# Patient Record
(demographics unavailable — no encounter records)

---

## 2019-04-30 NOTE — ED PEDIATRIC ILLNESS
HPI-Pediatric Illness


General


Chief Complaint:  Pediatric Illness/Problems


Stated Complaint:  DRANK BLEACH


Source:  family


Exam Limitations:  no limitations





History of Present Illness


Date Seen by Provider:  Apr 30, 2019


Time Seen by Provider:  19:46


Initial Comments


To ER per parents with reports that he Dumped bleach over his face and might 

have drank some of it just prior to arrival. This was not industrial strength 

bleach, this was [employer] household bleach.


Timing/Duration:  1/2 hour


Severity:  mild


Presenting Symptoms:  No painful swallowing





Allergies and Home Medications


Patient Home Medication List


Home Medication List Reviewed:  Yes





Review of Systems


Review of Systems


Constitutional:  see HPI


EENTM:  see HPI


Respiratory:  no symptoms reported; No cough


Genitourinary:  no symptoms reported


Musculoskeletal:  no symptoms reported


Skin:  no symptoms reported


Psychiatric/Neurological:  No Symptoms Reported


Endocrine:  No Symptoms Reported


Hematologic/Lymphatic:  No Symptoms Reported





[facility]-Pediatrics


Recent Foreign Travel:  No


Contact w/other who traveled:  No


Hospitalization with Isolation:  Denies


Seasonal Allergies:  Yes





Physical Exam-Pediatric


Physical Exam





Vital Signs - First Documented








 4/30/19 4/30/19





 19:47 20:23


 


Temp 97.6 


 


Pulse 144 


 


Resp  30


 


Pulse Ox  100


 


O2 Delivery Room Air 





Capillary Refill :


Height, Weight, BMI


Height: '"


Weight: lbs. oz. kg;  BMI


Method:


General Appearance:  no acute distress, see HPI, active, playful, smiles, other 

(swallowing his own secretions, no distress,)


HENT:  head inspection normal, fontanelle closed/normal, PERRL, TMs normal, 

other (eyes open, no squinting or frequent blinking. )


Neck:  non-tender, full range of motion


Respiratory:  no respiratory distress, no accessory muscle use


Gastrointestinal:  normal bowel sounds, soft


Extremities:  normal range of motion, non-tender


Neurologic/Psychiatric:  alert, normal mood/affect, oriented x 3


Skin:  normal color, warm/dry





Progress/Results/Core Measures


Results/Orders


Vital Signs/I&O











 4/30/19 4/30/19





 19:47 20:23


 


Temp 97.6 


 


Pulse 144 130


 


Resp  30


 


B/P (MAP)  


 


Pulse Ox  100


 


O2 Delivery Room Air 











Departure


Impression





 Primary Impression:  


 accidental ingestion of bleach


Disposition:  01 HOME, SELF-CARE


Condition:  Stable





Departure-Patient Inst.


Decision time for Depature:  19:47


Referrals:  


LAKESHA FARRELL DO (PCP/Family)


Primary Care Physician


Patient Instructions:  ACCIDENTAL INGESTION NON-TOXIC





Add. Discharge Instructions:  


All discharge instructions reviewed with patient and/or family. Voiced 

understanding.











STACIA AVINA APRN Apr 30, 2019 19:47

## 2019-04-30 NOTE — NUR
Poison control called, advised to observe pt for 1 hour, give PO fluids and 
watch for any vomiting, if no S/S of distress after 1 hour, pt can discharge 
home